# Patient Record
Sex: MALE | Race: WHITE | NOT HISPANIC OR LATINO | Employment: FULL TIME | URBAN - METROPOLITAN AREA
[De-identification: names, ages, dates, MRNs, and addresses within clinical notes are randomized per-mention and may not be internally consistent; named-entity substitution may affect disease eponyms.]

---

## 2023-09-03 ENCOUNTER — APPOINTMENT (OUTPATIENT)
Dept: CT IMAGING | Facility: CLINIC | Age: 28
End: 2023-09-03
Attending: EMERGENCY MEDICINE

## 2023-09-03 ENCOUNTER — HOSPITAL ENCOUNTER (EMERGENCY)
Facility: CLINIC | Age: 28
Discharge: HOME | End: 2023-09-04
Attending: EMERGENCY MEDICINE | Admitting: EMERGENCY MEDICINE
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
## 2023-09-03 DIAGNOSIS — F10.920 ALCOHOLIC INTOXICATION WITHOUT COMPLICATION (H): ICD-10-CM

## 2023-09-03 DIAGNOSIS — S01.112A EYEBROW LACERATION, LEFT, INITIAL ENCOUNTER: ICD-10-CM

## 2023-09-03 PROCEDURE — 36415 COLL VENOUS BLD VENIPUNCTURE: CPT | Performed by: EMERGENCY MEDICINE

## 2023-09-03 PROCEDURE — 72125 CT NECK SPINE W/O DYE: CPT

## 2023-09-03 PROCEDURE — 99283 EMERGENCY DEPT VISIT LOW MDM: CPT | Mod: 25 | Performed by: EMERGENCY MEDICINE

## 2023-09-03 PROCEDURE — 12011 RPR F/E/E/N/L/M 2.5 CM/<: CPT | Performed by: EMERGENCY MEDICINE

## 2023-09-03 PROCEDURE — 70450 CT HEAD/BRAIN W/O DYE: CPT

## 2023-09-03 PROCEDURE — 70450 CT HEAD/BRAIN W/O DYE: CPT | Mod: 26 | Performed by: STUDENT IN AN ORGANIZED HEALTH CARE EDUCATION/TRAINING PROGRAM

## 2023-09-03 PROCEDURE — 72125 CT NECK SPINE W/O DYE: CPT | Mod: 26 | Performed by: STUDENT IN AN ORGANIZED HEALTH CARE EDUCATION/TRAINING PROGRAM

## 2023-09-03 PROCEDURE — 99284 EMERGENCY DEPT VISIT MOD MDM: CPT | Performed by: EMERGENCY MEDICINE

## 2023-09-03 PROCEDURE — 82077 ASSAY SPEC XCP UR&BREATH IA: CPT | Performed by: EMERGENCY MEDICINE

## 2023-09-04 VITALS
HEART RATE: 89 BPM | SYSTOLIC BLOOD PRESSURE: 133 MMHG | DIASTOLIC BLOOD PRESSURE: 89 MMHG | TEMPERATURE: 97.6 F | RESPIRATION RATE: 16 BRPM | OXYGEN SATURATION: 98 %

## 2023-09-04 LAB
ETHANOL SERPL-MCNC: 0.26 G/DL
GLUCOSE BLDC GLUCOMTR-MCNC: 133 MG/DL (ref 70–99)
HOLD SPECIMEN: NORMAL

## 2023-09-04 PROCEDURE — 82962 GLUCOSE BLOOD TEST: CPT

## 2023-09-04 RX ORDER — LIDOCAINE HYDROCHLORIDE AND EPINEPHRINE 10; 10 MG/ML; UG/ML
10 INJECTION, SOLUTION INFILTRATION; PERINEURAL ONCE
Status: DISCONTINUED | OUTPATIENT
Start: 2023-09-04 | End: 2023-09-04 | Stop reason: HOSPADM

## 2023-09-04 ASSESSMENT — ACTIVITIES OF DAILY LIVING (ADL)
ADLS_ACUITY_SCORE: 35

## 2023-09-04 NOTE — ED PROVIDER NOTES
[place] EMERGENCY DEPARTMENT ([facility])    9/03/23         History     Chief Complaint   Patient presents with    Head Injury     HPI  Jagdeep Hamm is a 27 year old male who history of palpitations, anxiety disorder, and nicotine dependence, who presents to the emergency department for evaluation of head injury.  Patient was reportedly brought into the waiting room by [place] fire department/police with report of alcohol intoxication and head injury.  On arrival, patient is incredibly intoxicated.  He is unable to provide any additional information.    After sobering in the ED, patient was reinterviewed.  He admits to drinking alcohol tonight.  He states he got an argument with his friend.  He does not remember falling and hitting his head.  Currently, has some pain to his left eyebrow laceration but denies any other symptoms.  Specifically, denies any headache, neck pain, vision change, or any other symptoms.    Past Medical History  No past medical history on file.  No past surgical history on file.  No current outpatient medications on file.    No Known Allergies  Family History  No family history on file.  Social History          A medically appropriate review of systems was performed with pertinent positives and negatives noted in the HPI, and all other systems negative.    Physical Exam   BP: (!) 140/94  Pulse: 117  Temp: 98.2  F (36.8  C)  Resp: 23  SpO2: 93 %  Physical Exam  Vitals and nursing note reviewed.   Constitutional:       General: He is not in acute distress.     Appearance: Normal appearance.      Comments: Slurred speech, unsteady gait, EtOH smell   HENT:      Head: Normocephalic.      Nose: Nose normal.   Eyes:      Pupils: Pupils are equal, round, and reactive to light.     Cardiovascular:      Rate and Rhythm: Normal rate and regular rhythm.   Pulmonary:      Effort: Pulmonary effort is normal.   Abdominal:      General: There is no distension.   Musculoskeletal:          General: No deformity. Normal range of motion.      Cervical back: Normal range of motion.   Skin:     General: Skin is warm.   Neurological:      Mental Status: He is alert and oriented to person, place, and time.   Psychiatric:         Mood and Affect: Mood normal.         ED Course, Procedures, & Data      [facility]    -Laceration Repair    Date/Time: 9/4/2023 7:33 AM    Performed by: Reji Chavez DO  Authorized by: Reji Chavez DO    Risks, benefits and alternatives discussed.      ANESTHESIA (see MAR for exact dosages):     Anesthesia method:  Local infiltration    Local anesthetic:  Lidocaine 1% WITH epi  LACERATION DETAILS     Location:  Face    Face location:  L eyebrow    Length (cm):  1.5    Depth (mm):  3    REPAIR TYPE:     Repair type:  Intermediate    EXPLORATION:     Hemostasis achieved with:  Epinephrine and direct pressure    Wound exploration: wound explored through full range of motion and entire depth of wound probed and visualized      Wound extent: no foreign body, no signs of injury, no tendon damage, no underlying fracture and no vascular damage      Contaminated: no      TREATMENT:     Area cleansed with:  Betadine and soap and water    Amount of cleaning:  Extensive    Irrigation solution:  Sterile water    Irrigation volume:  1000    Irrigation method:  Syringe    SKIN REPAIR     Repair method:  Sutures    Suture size:  5-0    Suture material:  Nylon    Suture technique:  Simple interrupted    Number of sutures:  4    APPROXIMATION     Approximation:  Close    POST-PROCEDURE DETAILS     Dressing:  Antibiotic ointment           Results for orders placed or performed during the hospital encounter of 09/03/23   CT Head w/o Contrast     Status: None    Narrative    EXAM: CT HEAD W/O CONTRAST, CT CERVICAL SPINE W/O CONTRAST  LOCATION: [facility] [place]  DATE: 9/4/2023    INDICATION: Trauma,  fall, etoh  COMPARISON: None.  TECHNIQUE:   1) Routine CT Head without IV contrast. Multiplanar reformats. Dose reduction techniques were used.  2) Routine CT Cervical Spine without IV contrast. Multiplanar reformats. Dose reduction techniques were used.    FINDINGS:   HEAD CT:   INTRACRANIAL CONTENTS: No intracranial hemorrhage, extraaxial collection, or mass effect.  No CT evidence of acute infarct. Normal parenchymal attenuation. Normal ventricles and sulci.     VISUALIZED ORBITS/SINUSES/MASTOIDS: No intraorbital abnormality. No significant paranasal sinus mucosal disease. No middle ear or mastoid effusion.    BONES/SOFT TISSUES: Left frontal/periorbital soft tissue swelling. No skull fracture.    CERVICAL SPINE CT:   VERTEBRA: Normal vertebral body heights and alignment. Osseous fusion of C2-C3 on a developmental basis. No acute compression fracture or posttraumatic subluxation.     CANAL/FORAMINA: No significant canal or neural foraminal stenosis.    PARASPINAL: No prevertebral edema.       Impression    IMPRESSION:  HEAD CT:  1.  No acute intracranial process.    CERVICAL SPINE CT:  1.  No acute cervical spine fracture.   CT Cervical Spine w/o Contrast     Status: None    Narrative    EXAM: CT HEAD W/O CONTRAST, CT CERVICAL SPINE W/O CONTRAST  LOCATION: [facility]  DATE: 9/4/2023    INDICATION: Trauma, fall, etoh  COMPARISON: None.  TECHNIQUE:   1) Routine CT Head without IV contrast. Multiplanar reformats. Dose reduction techniques were used.  2) Routine CT Cervical Spine without IV contrast. Multiplanar reformats. Dose reduction techniques were used.    FINDINGS:   HEAD CT:   INTRACRANIAL CONTENTS: No intracranial hemorrhage, extraaxial collection, or mass effect.  No CT evidence of acute infarct. Normal parenchymal attenuation. Normal ventricles and sulci.     VISUALIZED ORBITS/SINUSES/MASTOIDS: No intraorbital abnormality. No significant paranasal sinus mucosal  disease. No middle ear or mastoid effusion.    BONES/SOFT TISSUES: Left frontal/periorbital soft tissue swelling. No skull fracture.    CERVICAL SPINE CT:   VERTEBRA: Normal vertebral body heights and alignment. Osseous fusion of C2-C3 on a developmental basis. No acute compression fracture or posttraumatic subluxation.     CANAL/FORAMINA: No significant canal or neural foraminal stenosis.    PARASPINAL: No prevertebral edema.       Impression    IMPRESSION:  HEAD CT:  1.  No acute intracranial process.    CERVICAL SPINE CT:  1.  No acute cervical spine fracture.   [place] Draw     Status: None (In process)    Narrative    The following orders were created for panel order [place] Draw.  Procedure                               Abnormality         Status                     ---------                               -----------         ------                     Extra Blue Top Tube[[id number]]                              Final result               Extra Red Top Tube[[id number]]                                                          Extra Green Top (Lithium...[[id number]]                      Final result               Extra Purple Top Tube[[id number]]                            Final result               Extra Purple Top Tube[[id number]]                            Final result                 Please view results for these tests on the individual orders.   Extra Blue Top Tube     Status: None   Result Value Ref Range    Hold Specimen JIC    Extra Green Top (Lithium Heparin) Tube     Status: None   Result Value Ref Range    Hold Specimen JIC    Extra Purple Top Tube     Status: None   Result Value Ref Range    Hold Specimen JIC    Extra Purple Top Tube     Status: None   Result Value Ref Range    Hold Specimen JIC    Ethyl Alcohol Level     Status: Abnormal   Result Value Ref Range    Alcohol ethyl 0.26 (H) <=0.01 g/dL   Glucose by meter     Status: Abnormal   Result Value Ref Range    GLUCOSE BY METER POCT 133 (H) 70 - 99  mg/dL     Medications   lidocaine 1% with EPINEPHrine 1:100,000 injection 10 mL (has no administration in time range)     Labs Ordered and Resulted from Time of ED Arrival to Time of ED Departure   ETHYL ALCOHOL LEVEL - Abnormal       Result Value    Alcohol ethyl 0.26 (*)    GLUCOSE BY METER - Abnormal    GLUCOSE BY METER POCT 133 (*)    GLUCOSE MONITOR NURSING POCT     CT Cervical Spine w/o Contrast   Final Result   IMPRESSION:   HEAD CT:   1.  No acute intracranial process.      CERVICAL SPINE CT:   1.  No acute cervical spine fracture.      CT Head w/o Contrast   Final Result   IMPRESSION:   HEAD CT:   1.  No acute intracranial process.      CERVICAL SPINE CT:   1.  No acute cervical spine fracture.             Critical care was not performed.     Medical Decision Making  The patient's presentation was of moderate complexity (an acute complicated injury).    The patient's evaluation involved:  ordering and/or review of 3+ test(s) in this encounter (see separate area of note for details)    The patient's management necessitated moderate risk (a decision regarding minor procedure (laceration repair) with risk factors of none).    Assessment & Plan    Patient presents the ED for evaluation of alcohol intoxication and a fall.  On arrival, patient has a alcohol level of 0.26.  He has altered mental status consistent with alcohol intoxication.  He has periorbital ecchymosis with a laceration underneath his left eyebrow.  This was repaired with 4 nonabsorbable sutures to be removed in 3 to 5 days.  Discussed proper wound care at home.  CT scan is negative for skull fracture, intracranial hemorrhage, cervical spine fracture, or other acute traumatic injury.    Was monitored in the ED till clinically sober.  His friend who is sober is able to walk him home.    Stated reasons to return to the ED.      I have reviewed the nursing notes. I have reviewed the findings, diagnosis, plan and need for follow up with the  patient.    There are no discharge medications for this patient.      Final diagnoses:   Alcoholic intoxication without complication (H)   Fall, initial encounter   Eyebrow laceration, left, initial encounter       Reji Chavez DO  [facility] EMERGENCY DEPARTMENT  9/3/2023     Reji Chavez DO  09/04/23 0737

## 2023-09-04 NOTE — ED NOTES
Pt ambulatory on discharge. Pt departed alert and responsive with steady gait. Pt reviewed with all discharge instruction. Pt verbalized understanding. PIV removed on discharge.

## 2023-09-04 NOTE — DISCHARGE INSTRUCTIONS
Your eyebrow laceration required 5 nonabsorbable sutures.  These will need to be removed in the next 5 days.  Apply Aquaphor over the area to help prevent scar formation.  Keep covered when out in the sun.  Follow-up with your primary care physician, urgent care or return to the ED for suture removal.    CT scans of your head and neck were negative for fracture.  You were discharged.  Return to the ED with any new or worsening symptoms.

## 2023-09-04 NOTE — ED TRIAGE NOTES
"      Patient arrvies via [facility] and with police with CC of \"head injury.\" Writer noticed that there was no report given to any RN and patient was found slumped over in chairs in waiting room  appearing very intoxicated. Charge RN made aware and brought back to bed 10. C collar placed with Mayito KAHN    "